# Patient Record
Sex: FEMALE | Race: WHITE | Employment: FULL TIME | URBAN - METROPOLITAN AREA
[De-identification: names, ages, dates, MRNs, and addresses within clinical notes are randomized per-mention and may not be internally consistent; named-entity substitution may affect disease eponyms.]

---

## 2019-05-04 ENCOUNTER — OFFICE VISIT - HEALTHEAST (OUTPATIENT)
Dept: FAMILY MEDICINE | Facility: CLINIC | Age: 39
End: 2019-05-04

## 2019-05-04 DIAGNOSIS — R04.0 EPISTAXIS: ICD-10-CM

## 2019-05-04 LAB — HGB BLD-MCNC: 14.7 G/DL (ref 12–16)

## 2019-06-26 ENCOUNTER — TELEPHONE (OUTPATIENT)
Dept: SURGERY | Facility: CLINIC | Age: 39
End: 2019-06-26

## 2019-06-26 NOTE — TELEPHONE ENCOUNTER
Patient is 5.5 years s/p DS with Dr. iLma.  Patient [facility] not been into [facility] for a follow-up appointment in over 4 years.  Attempted to contact patient via the phone multiple times to check in regarding post-op status.  Unable to reach patient via the phone at this time.

## 2021-06-03 VITALS — BODY MASS INDEX: 33.28 KG/M2 | WEIGHT: 200 LBS

## 2021-06-17 NOTE — PATIENT INSTRUCTIONS - HE
Patient Instructions by Dennise Hannah MD at 5/4/2019 12:50 PM     Author: Dennise Hannah MD Service: -- Author Type: Physician    Filed: 5/4/2019  2:45 PM Encounter Date: 5/4/2019 Status: Addendum    : Dennise Hannah MD (Physician)    Related Notes: Original Note by Dennise Hannah MD (Physician) filed at 5/4/2019  2:31 PM       1. Make an appointment for follow up at your primary clinic early next week  2. Review information on nosebleeds below  2. If symptoms are getting worse over time or you have any questions, call the clinic number - it's answered 24/7    Patient Education     Nosebleed (Adult)    Bleeding from the nose most commonly occurs because of injury or drying and cracking of the inner lining of the nose. Most nosebleeds are because of dry air or nose-picking. They can occur during a common cold or an allergy attack. They can also occur on a very hot day, or from dry air in the winter.  If the bleeding site is found, it may be cauterized. This means it is treated to cause a blood clot to form. This may be done with a chemical, heat, or electricity. If the bleeding continues after the site is cauterized, or if the site cannot be found, packing may be put in your nose. This is to apply pressure and stop the bleeding. The packing may be made of gauze or sponge. A small balloon catheter is sometimes used. These must be removed by your healthcare provider. Some types of packing dissolve on their own. If you are taking blood thinning (anticoagulant) medicine, you may have a blood test.  Home care    If packing was put in your nose, unless told otherwise, do not pull on it or try to remove it yourself. You will be given an appointment to have it removed. You may also have been given antibiotics to prevent a sinus infection. If so, finish all of the medicine.    Don't blow your nose for 12 hours after the bleeding stops. This will allow a strong blood clot to form. Don't pick your nose. This  may restart bleeding.    Don't drink alcohol or hot liquids for the next 2 days. Alcohol or hot liquids in your mouth can dilate blood vessels in your nose. This can cause bleeding to start again.    Don't take ibuprofen, naproxen, or medicines that contain aspirin. These thin the blood and may cause your nose to bleed. You may take acetaminophen for pain, unless another pain medicine was prescribed.    If the bleeding starts again, sit up and lean forward to prevent swallowing blood. Pinch your nose tightly on both sides, as shown above, for 10 to 15 minutes. Time yourself. Dont release the pressure on your nose until 10 minutes is up. If bleeding does not stop, continue to pinch your nose and call your healthcare provider or return to this facility.    If you have a cold, allergies, or dry nasal membranes, lubricate the nasal passages. Apply a small amount of petroleum jelly inside the nose with a cotton swab twice a day (morning and night).    Don't overheat your home. This can dry the air and make your condition worse.    Put a humidifier in the room where you sleep. This will add moisture to the air. Clean the humidifier as advised by the .    Use a saline nasal spray to keep nasal passages moist.    Don't pick your nose. Keep fingernails trimmed to decrease risk of bleeds.    Don't smoke.  Follow-up care  Follow up with your healthcare provider, or as advised. Nasal packing should be rechecked or removed within 2 to 3 days.  When to seek medical advice  Call your healthcare provider right away if any of these occur.    You have another nosebleed that you cannot control    Dizziness, weakness, or fainting    You become tired or confused    Fever of 100.4 F (38 C) or higher, or as directed by your healthcare provider    Headache    Sinus or facial pain    Shortness of breath or trouble breathing  Date Last Reviewed: 11/1/2017 2000-2017 [employer]. [address] [facility], [place]  [address]. All rights reserved. This information is not intended as a substitute for professional medical care. Always follow your healthcare professional's instructions.

## 2021-06-27 NOTE — PROGRESS NOTES
Progress Notes by Dennise Hannah MD at 5/4/2019 12:50 PM     Author: Dennise Hannah MD Service: -- Author Type: Physician    Filed: 5/4/2019  4:29 PM Encounter Date: 5/4/2019 Status: Signed    : Dennise Hannah MD (Physician)         Subjective:   Barbara Dalton is a 39 y.o. female and is new to [facility].  Roomed by: loretta    Refills needed? No    Do you have any forms that need to be filled out? No      Chief Complaint   Patient presents with   ? Epistaxis     has hx of.  started last night then re started at 8:00am, clotting, left side   Primary Clinic is [facility]. Has allergy symptoms of itchy eyes, runny nose and dry nose. Denies having recent URI symptoms. Says nasal spray irritates her nose. Has not recently seen ENT. Denies any recent fevers or chills, but says feels cold all of time. Has frequent headaches all of time. Denies any cough, CP or shortness of breath. Admits being able to eat, drink and urinate normally. Denies any recent nausea, vomiting, belly pain, or diarrhea. Energy level has not changed.  Patient admits to drinking alcoholic beverage within the last 48 hours and also admits to taking an Excedrin within the last 24 hours.    PMH -  See Problem list - Anxiety, Deprssion - see psychiatrist and therapist    Past Surgical History:   Procedure Laterality Date   ? VT DENTAL SURGERY PROCEDURE      Description: Oral Surgery Tooth Extraction;  Recorded: 06/25/2012;   ? VT REMOVAL OF TONSILS,<11 Y/O      Description: Tonsillectomy;  Recorded: 06/25/2012;     Family Hx - HTN - mother, maternal uncles, DM - mother, maternal uncles, CAD - none, Cancer - paternal GM  - Breast  Patient Active Problem List   Diagnosis   ? Seborrheic Dermatitis   ? Edema   ? Hypokalemia   ? Benign Essential Hypertension   ? Morbid Obesity   ? Chronic Major Depression   Social HX - Single, never smoker,     Review of Systems  See HPI for ROS, otherwise all other systems  negative  Allergies   Allergen Reactions   ? Penicillins        Current Outpatient Medications:   ?  CALCIUM CARBONATE-VITAMIN D2 ORAL, , Disp: , Rfl:   ?  cetirizine (ZYRTEC) 10 MG tablet, Take 10 mg by mouth daily., Disp: , Rfl: 3  ?  cholecalciferol, vitamin D3, 2,000 unit Tab, , Disp: , Rfl:   ?  ergocalciferol (ERGOCALCIFEROL) 50,000 unit capsule, TK 1 C PO 1 TIME Q WK, Disp: , Rfl: 1  ?  venlafaxine (EFFEXOR) 50 MG tablet, Take 50 mg by mouth., Disp: , Rfl:     Objective:     Vitals:    05/04/19 1323   BP: 130/82   Pulse: 77   Temp: 97.6  F (36.4  C)   TempSrc: Oral   SpO2: 98%   Weight: 200 lb (90.7 kg)   Gen - Pt in NAD  Nose -  not congested, no nasal drainage -right nares-turbinates are not edematous, no bleeding noted; left nares is slightly hyperemic but no active bleeding noted  Pharynx - non injected, tonsils 1+ size  Neck - no cervical adenopathy    Results for orders placed or performed in visit on 05/04/19   Hemoglobin   Result Value Ref Range    Hemoglobin 14.7 12.0 - 16.0 g/dL   Lab result discussed on day of visit.      Assessment - Plan   Medical Decision Making -39-year-old woman presents with a nosebleed off and on for 4 hours that stopped while she was waiting to be seen in clinic.  Reviewed the patient information on epistaxis prevention.  Patient was hesitant to put Vaseline in her nose but recommended this and using a humidifier.  Patient is to follow-up with her primary within the next 4 days.    1. Epistaxis  - Hemoglobin    At the conclusion of the encounter, assessment and plan were discussed. All questions were answered. The patient or guardian acknowledged understanding and was involved in the decision making regarding the overall care plan.    Patient Instructions   1. Make an appointment for follow up at your primary clinic  2. If symptoms are getting worse over time or you have any questions, call the clinic number - it's answered 24/7    Patient Education     Nosebleed  (Adult)    Bleeding from the nose most commonly occurs because of injury or drying and cracking of the inner lining of the nose. Most nosebleeds are because of dry air or nose-picking. They can occur during a common cold or an allergy attack. They can also occur on a very hot day, or from dry air in the winter.  If the bleeding site is found, it may be cauterized. This means it is treated to cause a blood clot to form. This may be done with a chemical, heat, or electricity. If the bleeding continues after the site is cauterized, or if the site cannot be found, packing may be put in your nose. This is to apply pressure and stop the bleeding. The packing may be made of gauze or sponge. A small balloon catheter is sometimes used. These must be removed by your healthcare provider. Some types of packing dissolve on their own. If you are taking blood thinning (anticoagulant) medicine, you may have a blood test.  Home care    If packing was put in your nose, unless told otherwise, do not pull on it or try to remove it yourself. You will be given an appointment to have it removed. You may also have been given antibiotics to prevent a sinus infection. If so, finish all of the medicine.    Don't blow your nose for 12 hours after the bleeding stops. This will allow a strong blood clot to form. Don't pick your nose. This may restart bleeding.    Don't drink alcohol or hot liquids for the next 2 days. Alcohol or hot liquids in your mouth can dilate blood vessels in your nose. This can cause bleeding to start again.    Don't take ibuprofen, naproxen, or medicines that contain aspirin. These thin the blood and may cause your nose to bleed. You may take acetaminophen for pain, unless another pain medicine was prescribed.    If the bleeding starts again, sit up and lean forward to prevent swallowing blood. Pinch your nose tightly on both sides, as shown above, for 10 to 15 minutes. Time yourself. Dont release the pressure on your  nose until 10 minutes is up. If bleeding does not stop, continue to pinch your nose and call your healthcare provider or return to this facility.    If you have a cold, allergies, or dry nasal membranes, lubricate the nasal passages. Apply a small amount of petroleum jelly inside the nose with a cotton swab twice a day (morning and night).    Don't overheat your home. This can dry the air and make your condition worse.    Put a humidifier in the room where you sleep. This will add moisture to the air. Clean the humidifier as advised by the .    Use a saline nasal spray to keep nasal passages moist.    Don't pick your nose. Keep fingernails trimmed to decrease risk of bleeds.    Don't smoke.  Follow-up care  Follow up with your healthcare provider, or as advised. Nasal packing should be rechecked or removed within 2 to 3 days.  When to seek medical advice  Call your healthcare provider right away if any of these occur.    You have another nosebleed that you cannot control    Dizziness, weakness, or fainting    You become tired or confused    Fever of 100.4 F (38 C) or higher, or as directed by your healthcare provider    Headache    Sinus or facial pain    Shortness of breath or trouble breathing  Date Last Reviewed: 11/1/2017 2000-2017 [employer]. [address] [place] [address], [place] [address]. All rights reserved. This information is not intended as a substitute for professional medical care. Always follow your healthcare professional's instructions.